# Patient Record
Sex: FEMALE | Race: WHITE | NOT HISPANIC OR LATINO | Employment: FULL TIME | URBAN - METROPOLITAN AREA
[De-identification: names, ages, dates, MRNs, and addresses within clinical notes are randomized per-mention and may not be internally consistent; named-entity substitution may affect disease eponyms.]

---

## 2019-06-06 ENCOUNTER — OFFICE VISIT CONVERTED (OUTPATIENT)
Dept: ORTHOPEDIC SURGERY | Facility: CLINIC | Age: 54
End: 2019-06-06
Attending: ORTHOPAEDIC SURGERY

## 2019-06-06 ENCOUNTER — CONVERSION ENCOUNTER (OUTPATIENT)
Dept: ORTHOPEDIC SURGERY | Facility: CLINIC | Age: 54
End: 2019-06-06

## 2019-07-09 ENCOUNTER — OFFICE VISIT CONVERTED (OUTPATIENT)
Dept: ORTHOPEDIC SURGERY | Facility: CLINIC | Age: 54
End: 2019-07-09
Attending: ORTHOPAEDIC SURGERY

## 2019-08-20 ENCOUNTER — OFFICE VISIT CONVERTED (OUTPATIENT)
Dept: ORTHOPEDIC SURGERY | Facility: CLINIC | Age: 54
End: 2019-08-20
Attending: ORTHOPAEDIC SURGERY

## 2019-12-27 ENCOUNTER — OFFICE VISIT CONVERTED (OUTPATIENT)
Dept: FAMILY MEDICINE CLINIC | Facility: CLINIC | Age: 54
End: 2019-12-27
Attending: NURSE PRACTITIONER

## 2020-02-04 ENCOUNTER — HOSPITAL ENCOUNTER (OUTPATIENT)
Dept: MAMMOGRAPHY | Facility: HOSPITAL | Age: 55
Discharge: HOME | End: 2020-02-04
Attending: NURSE PRACTITIONER

## 2020-10-21 ENCOUNTER — OFFICE VISIT CONVERTED (OUTPATIENT)
Dept: FAMILY MEDICINE CLINIC | Facility: CLINIC | Age: 55
End: 2020-10-21
Attending: NURSE PRACTITIONER

## 2020-10-21 ENCOUNTER — HOSPITAL ENCOUNTER (OUTPATIENT)
Dept: FAMILY MEDICINE CLINIC | Facility: CLINIC | Age: 55
Discharge: HOME | End: 2020-10-21
Attending: NURSE PRACTITIONER

## 2020-10-21 LAB
ALBUMIN SERPL-MCNC: 4.5 G/DL (ref 3.5–5)
ALBUMIN/GLOB SERPL: 1.6 {RATIO} (ref 1.4–2.6)
ALP SERPL-CCNC: 60 U/L (ref 53–141)
ALT SERPL-CCNC: 19 U/L (ref 10–40)
ANION GAP SERPL CALC-SCNC: 15 MMOL/L (ref 8–19)
AST SERPL-CCNC: 23 U/L (ref 15–50)
BASOPHILS # BLD AUTO: 0.03 10*3/UL (ref 0–0.2)
BASOPHILS NFR BLD AUTO: 0.5 % (ref 0–3)
BILIRUB SERPL-MCNC: 0.26 MG/DL (ref 0.2–1.3)
BUN SERPL-MCNC: 19 MG/DL (ref 5–25)
BUN/CREAT SERPL: 18 {RATIO} (ref 6–20)
CALCIUM SERPL-MCNC: 9.4 MG/DL (ref 8.7–10.4)
CHLORIDE SERPL-SCNC: 103 MMOL/L (ref 99–111)
CHOLEST SERPL-MCNC: 247 MG/DL (ref 107–200)
CHOLEST/HDLC SERPL: 3.4 {RATIO} (ref 3–6)
CONV ABS IMM GRAN: 0.02 10*3/UL (ref 0–0.2)
CONV CO2: 27 MMOL/L (ref 22–32)
CONV IMMATURE GRAN: 0.3 % (ref 0–1.8)
CONV TOTAL PROTEIN: 7.3 G/DL (ref 6.3–8.2)
CREAT UR-MCNC: 1.04 MG/DL (ref 0.5–0.9)
DEPRECATED RDW RBC AUTO: 45.4 FL (ref 36.4–46.3)
EOSINOPHIL # BLD AUTO: 0.27 10*3/UL (ref 0–0.7)
EOSINOPHIL # BLD AUTO: 4.4 % (ref 0–7)
ERYTHROCYTE [DISTWIDTH] IN BLOOD BY AUTOMATED COUNT: 13 % (ref 11.7–14.4)
GFR SERPLBLD BASED ON 1.73 SQ M-ARVRAT: >60 ML/MIN/{1.73_M2}
GLOBULIN UR ELPH-MCNC: 2.8 G/DL (ref 2–3.5)
GLUCOSE SERPL-MCNC: 93 MG/DL (ref 65–99)
HCT VFR BLD AUTO: 40.6 % (ref 37–47)
HDLC SERPL-MCNC: 73 MG/DL (ref 40–60)
HGB BLD-MCNC: 12.7 G/DL (ref 12–16)
LDLC SERPL CALC-MCNC: 154 MG/DL (ref 70–100)
LYMPHOCYTES # BLD AUTO: 2.04 10*3/UL (ref 1–5)
LYMPHOCYTES NFR BLD AUTO: 33.1 % (ref 20–45)
MCH RBC QN AUTO: 29.7 PG (ref 27–31)
MCHC RBC AUTO-ENTMCNC: 31.3 G/DL (ref 33–37)
MCV RBC AUTO: 95.1 FL (ref 81–99)
MONOCYTES # BLD AUTO: 0.54 10*3/UL (ref 0.2–1.2)
MONOCYTES NFR BLD AUTO: 8.8 % (ref 3–10)
NEUTROPHILS # BLD AUTO: 3.26 10*3/UL (ref 2–8)
NEUTROPHILS NFR BLD AUTO: 52.9 % (ref 30–85)
NRBC CBCN: 0 % (ref 0–0.7)
OSMOLALITY SERPL CALC.SUM OF ELEC: 294 MOSM/KG (ref 273–304)
PLATELET # BLD AUTO: 265 10*3/UL (ref 130–400)
PMV BLD AUTO: 10.8 FL (ref 9.4–12.3)
POTASSIUM SERPL-SCNC: 4.4 MMOL/L (ref 3.5–5.3)
RBC # BLD AUTO: 4.27 10*6/UL (ref 4.2–5.4)
SODIUM SERPL-SCNC: 141 MMOL/L (ref 135–147)
TRIGL SERPL-MCNC: 101 MG/DL (ref 40–150)
TSH SERPL-ACNC: 1.22 M[IU]/L (ref 0.27–4.2)
VLDLC SERPL-MCNC: 20 MG/DL (ref 5–37)
WBC # BLD AUTO: 6.16 10*3/UL (ref 4.8–10.8)

## 2020-10-22 LAB — 25(OH)D3 SERPL-MCNC: 43.7 NG/ML (ref 30–100)

## 2020-10-28 ENCOUNTER — OFFICE VISIT CONVERTED (OUTPATIENT)
Dept: FAMILY MEDICINE CLINIC | Facility: CLINIC | Age: 55
End: 2020-10-28
Attending: NURSE PRACTITIONER

## 2020-10-29 ENCOUNTER — HOSPITAL ENCOUNTER (OUTPATIENT)
Dept: FAMILY MEDICINE CLINIC | Facility: CLINIC | Age: 55
Discharge: HOME | End: 2020-10-29
Attending: NURSE PRACTITIONER

## 2020-11-03 LAB
CONV LAST MENSTURAL PERIOD: NORMAL
HPV HYBRID CAPTURE HIGH RISK: NEGATIVE
SPECIMEN SOURCE: NORMAL
SPECIMEN SOURCE: NORMAL
THIN PREP CVX: NORMAL

## 2021-05-13 NOTE — PROGRESS NOTES
Progress Note      Patient Name: Obdulia Lora   Patient ID: [id number]   Sex: Female   YOB: 1965    Primary Care Provider: Denilson REED   Referring Provider: Denilson REED    Visit Date: October 21, 2020    Provider: DEREK Aguirre   Location: [facility] [place]   Location Address: [address] [place] [address], [address] [place] [address]  [id number]   Location Phone: [phone]          Chief Complaint  · Annual physical exam  · Seasonal allergies      History Of Present Illness  Obdulia Lora is a 55 year old /White female who presents for evaluation and treatment of:      Patient is a 55-year-old female who comes in for annual physical exam.  She declines a flu shot.  She was a former smoker has quit for several years.  Last colorectal screening was 45 years ago.  She is in need of a Pap smear.  Blood pressure stable at 132/80.  Heart rate 85.  She denies any headache, chest pain, change in vision.  She otherwise feels well with minimal complaints.  She eats well, tries to exercise on a daily basis.    Patient's only complaint is seasonal allergies.  She feels like the Claritin is not working.  She has been prescribed Flonase in the past and would like to be placed back on the medication.  Patient would like a different medication besides Claritin to help with her allergies.       Allergy List  Allergen Name Date Reaction Notes   NO KNOWN DRUG ALLERGIES --  --  --        Allergies Reconciled  Family Medical History  Disease Name Relative/Age Notes   Heart Disease Father/   --    Melanoma Father/   --          Social History  Finding Status Start/Stop Quantity Notes   Alcohol Use Current some day --/-- --  occasionally drinks   lives with spouse --  --/-- --  --    . --  --/-- --  --    Recreational Drug Use Never --/-- --  no   Tobacco Former 1990/1995 --  smoked 1ppd   Working --  --/-- --  --          Review of Systems  · Constitutional  o Denies  o :  "fever, fatigue, weight loss, weight gain  · Eyes  o Denies  o : double vision, impaired vision, blurred vision  · HENT  o Admits  o : nasal congestion, nasal discharge  o Denies  o : headaches, vertigo, lightheadedness  · Cardiovascular  o Denies  o : lower extremity edema, claudication, chest pressure, palpitations  · Respiratory  o Denies  o : shortness of breath, wheezing, cough, hemoptysis, dyspnea on exertion  · Gastrointestinal  o Denies  o : nausea, vomiting, diarrhea, constipation, abdominal pain  · Integument  o Denies  o : rash, itching, pigmentation changes  · Musculoskeletal  o Denies  o : joint pain, joint swelling, muscle pain  · Psychiatric  o Denies  o : anxiety, depression, impulsive behaviors  · Allergic-Immunologic  o Admits  o : sinus allergy symptoms  o Denies  o : frequent illnesses      Vitals  Date Time BP Position Site L\R Cuff Size HR RR TEMP (F) WT  HT  BMI kg/m2 BSA m2 O2 Sat FR L/min FiO2        10/21/2020 03:22 /80 Sitting    85 - R  98.6 154lbs 4oz 5'  2\" 28.21 1.75 95 %            Physical Examination  · Constitutional  o Appearance  o : well-nourished, well developed, in no acute distress  · Eyes  o Conjunctivae  o : conjunctivae normal, no exudates present  o Sclerae  o : sclerae white  o Pupils and Irises  o : pupils equal and round, and reactive to light and accomodation bilaterally  o Eyelids/Ocular Adnexae  o : extra ocular movements intact  · Respiratory  o Respiratory Effort  o : breathing unlabored, no accessory muscle use  o Inspection of Chest  o : normal appearance, no retractions  o Auscultation of Lungs  o : normal breath sounds bilaterally  · Cardiovascular  o Heart  o :   § Auscultation of Heart  § : regular rate and rhythm, no murmurs, gallops or rubs  o Peripheral Vascular System  o :   § Extremities  § : no edema  · Neurologic  o Mental Status Examination  o :   § Orientation  § : alert and oriented x3  § Speech/Language  § : normal speech pattern  o Gait " and Station  o : normal gait, able to stand without difficulty  · Psychiatric  o Judgement and Insight  o : judgment and insight intact, judgement for everyday activities and social situations within normal limits, insight intact  o Thought Processes  o : rate of thoughts normal, thought content logical  o Mood and Affect  o : mood normal, affect appropriate              Assessment  · Annual physical exam     V70.0/Z00.00  · Allergic rhinitis due to allergen     477.9/J30.9  Will discontinue Claritin, start on Allegra and breakthrough allergy medication. We will also prescribe Flonase per patient's request. Discussed return precautions. Patient verbalized understanding is agreeable treatment plan.  · Vitamin D deficiency     268.9/E55.9      Plan  · Orders  o Physical, Primary Care Panel (CBC, CMP, Lipid, TSH) [facility] (28882, 67029, 74841, 60649) - V70.0/Z00.00 - 10/21/2020  o Vitamin D Level (59286) - 268.9/E55.9 - 10/21/2020  o ACO-39: Current medications updated and reviewed (1159F, ) - - 10/21/2020  o ACO-14: Influenza immunization was not administered for reasons documented [facility] () - - 10/21/2020  · Medications  o betamethasone dipropionate 0.05 % topical cream   SIG: apply a thin layer to the affected area(s) by topical route 2 times per day PRN for 30 days   DISP: (60) Gram with 3 refills  Prescribed on 10/21/2020     o chlorpheniramine maleate 4 mg oral tablet   SIG: take 1 tablet (4 mg) by oral route every 4-6 hours as needed for 30 days   DISP: (90) Tablet with 3 refills  Prescribed on 10/21/2020     o fluticasone propionate 50 mcg/actuation nasal spray,suspension   SIG: spray 1 - 2 sprays in each nostril by intranasal route once daily   DISP: (1) Bottle with 2 refills  Prescribed on 10/21/2020     o fexofenadine 180 mg oral tablet   SIG: take 1 tablet (180 mg) by oral route once daily for 30 days   DISP: (30) Tablet with 2 refills  Prescribed on 10/21/2020     o betamethasone dipropionate 0.05 %  topical cream   SIG: apply a thin layer to the affected area(s) by topical route 2 times per day PRN for 30 days   DISP: (60) Gram with 3 refills  Discontinued on 10/21/2020     o Claritin 10 mg oral tablet   SIG: take 1 tablet (10 mg) by oral route once daily for 90 days   DISP: (90) tablets with 3 refills  Discontinued on 10/21/2020     · Instructions  o Reviewed health maintenance flowsheet and updated information. Orders were placed and/or patient's response was documented.  o Take all medications as prescribed/directed.  o Patient was educated/instructed on their diagnosis, treatment and medications prior to discharge from the clinic today.  o Call the office with any concerns or questions.  · Disposition  o Call or Return if symptoms worsen or persist.  o Follow up 1 year  o follow up as needed  o call the office with any questions or concerns            Electronically Signed by: Denilson Ramírez APRN -Author on October 21, 2020 04:00:27 PM

## 2021-05-13 NOTE — PROGRESS NOTES
Progress Note      Patient Name: Obdulia Lora   Patient ID: [id number]   Sex: Female   YOB: 1965    Primary Care Provider: Denilson REED   Referring Provider: Denilson REED    Visit Date: October 28, 2020    Provider: DEREK Aguirre   Location: [facility] [place]   Location Address: [address] [place] [address], [address] [place] [address]  [id number]   Location Phone: [phone]          Chief Complaint  · Annual Exam  · PAP exam  · (Health Maintainence Information Reviewed Under Results)      History Of Present Illness  Obdulia Lora is a 55 year old /White female who presents for evaluation and treatment of:   Last PAP Smear: 10/28/2018.   Date of Last Mammogram: 11/14/2019.   Date of Last Colonoscopy: 08/05/2016   No current complaints.      Patient 65-year-old female here for annual Pap smear.  She is never had abnormal Pap in the past.  Last mammogram 2019 was normal.  Last colonoscopy was 4-1/2 years ago.  Last Pap was 3 years ago.  She declines a flu shot.  She is a former smoker.  She has no complaints today.       Medication List  Name Date Started Instructions   chlorpheniramine maleate 4 mg oral tablet 10/21/2020 take 1 tablet (4 mg) by oral route every 4-6 hours as needed for 30 days   fexofenadine 180 mg oral tablet 10/21/2020 take 1 tablet (180 mg) by oral route once daily for 30 days   fluticasone propionate 50 mcg/actuation nasal spray,suspension 10/21/2020 spray 1 - 2 sprays in each nostril by intranasal route once daily         Allergy List  Allergen Name Date Reaction Notes   NO KNOWN DRUG ALLERGIES --  --  --          Family Medical History  Disease Name Relative/Age Notes   Heart Disease Father/   --    Melanoma Father/   --          Social History  Finding Status Start/Stop Quantity Notes   Alcohol Use Current some day --/-- --  occasionally drinks   lives with spouse --  --/-- --  --    . --  --/-- --  --    Recreational Drug Use Never  "--/-- --  no   Tobacco Former 1990/1995 --  smoked 1ppd   Working --  --/-- --  --          Review of Systems  · Constitutional  o Denies  o : fatigue, night sweats  · Eyes  o Denies  o : double vision, blurred vision  · HENT  o Denies  o : vertigo, recent head injury  · Breasts  o Denies  o : lumps, tenderness, swelling  · Cardiovascular  o Denies  o : chest pain, irregular heart beats  · Respiratory  o Denies  o : shortness of breath, productive cough  · Gastrointestinal  o Denies  o : nausea, vomiting  · Genitourinary  o Denies  o : dysuria, urinary retention  · Integument  o Denies  o : hair growth change, new skin lesions  · Neurologic  o Denies  o : altered mental status, seizures  · Musculoskeletal  o Denies  o : joint swelling, limitation of motion      Vitals  Date Time BP Position Site L\R Cuff Size HR RR TEMP (F) WT  HT  BMI kg/m2 BSA m2 O2 Sat FR L/min FiO2        10/28/2020 09:32 /78 Sitting    72 - R  97.3 155lbs 8oz 5'  2\" 28.44 1.76 97 %            Physical Examination  · Constitutional  o Appearance  o : well-nourished, in no acute distress  · Eyes  o Conjunctivae  o : conjunctivae normal, no exudates present  o Sclerae  o : sclerae white  o Pupils and Irises  o : pupils equal and round, and reactive to light and accomodation bilaterally  o Eyelids/Ocular Adnexae  o : extra ocular movements intact  · Neck  o Inspection/Palpation  o : normal appearance, no masses or tenderness, trachea midline  o Range of Motion  o : range of motion within normal limits  o Thyroid  o : gland size normal, nontender, no nodules or masses present on palpation  · Respiratory  o Respiratory Effort  o : breathing unlabored  o Inspection of Chest  o : normal appearance  o Auscultation of Lungs  o : normal breath sounds throughout  · Cardiovascular  o Heart  o :   § Auscultation of Heart  § : regular rate and rhythm, no murmurs, gallops or rubs  o Peripheral Vascular System  o :   § Extremities  § : no " edema  · Breasts  o Inspection of Breasts  o : breasts symmetrical, no skin changes, no deformities present, no discharge present  o Palpation of Breasts, Axillae  o : no masses present on palpation, no breast tenderness  · Genitourinary  o External Genitalia  o : no inflammation, no lesions present  o Vagina  o : normal vaginal vault, no discharge present, no inflammatory lesions present, no masses present  o Bladder  o : nontender to palpation  o Cervix  o : appearance healthy, no lesions present, nontender to palpation, no discharges, no bleeding present, normal midline position  o Uterus  o : nontender to palpation, no masses present, position midline/midplane  o Adnexa  o : no tenderness or masses present on bimanual examination  o Anus  o : no inflammation or lesions present  o Perineum  o : perineum within normal limits  · Neurologic  o Mental Status Examination  o :   § Orientation  § : grossly oriented to person, place and time  § Speech/Language  § : normal speech pattern  o Gait and Station  o : normal gait, able to stand without difficulty              Assessment  · Routine gynecological examination     V72.31/Z01.419  · Pap Smear     V76.2/Z01.419      Plan  · Orders  o Pap smear (65103) - V76.2/Z01.419 - 10/28/2020  o ACO-39: Current medications updated and reviewed (1159F, ) - - 10/28/2020  o ACO-14: Influenza immunization was not administered for reasons documented [facility] () - - 10/28/2020  · Instructions  o **Pap Test/Liquid Based:   o Thin Prep  o Source:   o Cervix  o ********  o **Perform a routine Human Papilloma Virus (HPV) High Risk on this Pap   o ********  o Medicare:  o No  o **Is this an annual PAP:  o Yes  o Post Menopausal:  o Previous Pap Smear Date: 3 years ago   o Take all medications as prescribed/directed.  o Patient was educated/instructed on their diagnosis, treatment and medications prior to discharge from the clinic today.  o Call the office with any concerns or  questions.  o Counseled on monthly breast self exams.   o Counseled on STD prevention.  o Counseled on diet and exercise.   o Counseled on weight-bearing exercise.  o Recommended Calcium with Vitamin D twice daily.  · Disposition  o Call or Return if symptoms worsen or persist.  o follow up as needed  o call the office with any questions or concerns            Electronically Signed by: Denilson Ramírez APRN -Author on October 28, 2020 10:16:15 AM

## 2021-05-14 VITALS
HEIGHT: 62 IN | TEMPERATURE: 98.6 F | BODY MASS INDEX: 28.39 KG/M2 | WEIGHT: 154.25 LBS | OXYGEN SATURATION: 95 % | SYSTOLIC BLOOD PRESSURE: 132 MMHG | HEART RATE: 85 BPM | DIASTOLIC BLOOD PRESSURE: 80 MMHG

## 2021-05-14 VITALS
WEIGHT: 155.5 LBS | BODY MASS INDEX: 28.61 KG/M2 | OXYGEN SATURATION: 97 % | HEART RATE: 72 BPM | SYSTOLIC BLOOD PRESSURE: 124 MMHG | DIASTOLIC BLOOD PRESSURE: 78 MMHG | TEMPERATURE: 97.3 F | HEIGHT: 62 IN

## 2021-05-15 VITALS — HEART RATE: 91 BPM | HEIGHT: 62 IN | OXYGEN SATURATION: 96 %

## 2021-05-15 VITALS
HEART RATE: 74 BPM | HEIGHT: 62 IN | OXYGEN SATURATION: 100 % | DIASTOLIC BLOOD PRESSURE: 82 MMHG | SYSTOLIC BLOOD PRESSURE: 126 MMHG | WEIGHT: 158.5 LBS | TEMPERATURE: 98.2 F | BODY MASS INDEX: 29.17 KG/M2

## 2021-05-15 VITALS — BODY MASS INDEX: 26.75 KG/M2 | WEIGHT: 145.37 LBS | HEART RATE: 94 BPM | HEIGHT: 62 IN | OXYGEN SATURATION: 98 %

## 2021-05-15 VITALS — HEIGHT: 62 IN | WEIGHT: 150 LBS | HEART RATE: 91 BPM | OXYGEN SATURATION: 98 % | BODY MASS INDEX: 27.6 KG/M2

## 2022-10-26 ENCOUNTER — OFFICE VISIT (OUTPATIENT)
Dept: FAMILY MEDICINE CLINIC | Facility: CLINIC | Age: 57
End: 2022-10-26

## 2022-10-26 VITALS
WEIGHT: 177 LBS | OXYGEN SATURATION: 100 % | BODY MASS INDEX: 32.57 KG/M2 | DIASTOLIC BLOOD PRESSURE: 74 MMHG | SYSTOLIC BLOOD PRESSURE: 114 MMHG | HEIGHT: 62 IN | HEART RATE: 79 BPM | TEMPERATURE: 98.4 F

## 2022-10-26 DIAGNOSIS — J21.9 ACUTE BRONCHIOLITIS DUE TO UNSPECIFIED ORGANISM: Primary | ICD-10-CM

## 2022-10-26 PROCEDURE — 99213 OFFICE O/P EST LOW 20 MIN: CPT | Performed by: NURSE PRACTITIONER

## 2022-10-26 RX ORDER — AZITHROMYCIN 250 MG/1
TABLET, FILM COATED ORAL
Qty: 6 TABLET | Refills: 0 | Status: SHIPPED | OUTPATIENT
Start: 2022-10-26

## 2022-10-26 RX ORDER — PREDNISONE 20 MG/1
40 TABLET ORAL DAILY
Qty: 10 TABLET | Refills: 0 | Status: SHIPPED | OUTPATIENT
Start: 2022-10-26 | End: 2022-10-31

## 2022-10-26 RX ORDER — GUAIFENESIN AND DEXTROMETHORPHAN HYDROBROMIDE 1200; 60 MG/1; MG/1
1 TABLET, EXTENDED RELEASE ORAL 2 TIMES DAILY
Qty: 28 EACH | Refills: 0 | Status: SHIPPED | OUTPATIENT
Start: 2022-10-26

## 2022-10-26 NOTE — PROGRESS NOTES
"Chief Complaint  Cough (X few months)    Subjective         Obdulia Lora presents to [facility]  Cough  This is a new problem. Episode onset: 1 month. The problem has been gradually worsening. The problem occurs every few minutes. The cough is productive of sputum (yellow sputum). Associated symptoms include ear congestion, shortness of breath and wheezing. Pertinent negatives include no chills, ear pain, fever, hemoptysis, nasal congestion, postnasal drip, sore throat or sweats. The symptoms are aggravated by lying down. She has tried OTC cough suppressant for the symptoms. The treatment provided no relief.          Social History     Socioeconomic History   • Marital status:    Tobacco Use   • Smoking status: Former     Packs/day: 0.10     Years: 3.00     Pack years: 0.30     Types: Cigarettes     Quit date:      Years since quittin.8   • Smokeless tobacco: Never   Vaping Use   • Vaping Use: Never used        Objective     Vitals:    10/26/22 1053   BP: 114/74   BP Location: Left arm   Patient Position: Sitting   Pulse: 79   Temp: 98.4 °F (36.9 °C)   TempSrc: Oral   SpO2: 100%   Weight: 80.3 kg (177 lb)   Height: 157.5 cm (62\")        Body mass index is 32.37 kg/m².    Wt Readings from Last 3 Encounters:   10/26/22 80.3 kg (177 lb)   10/28/20 70.5 kg (155 lb 8 oz)   10/21/20 70 kg (154 lb 4 oz)       BP Readings from Last 3 Encounters:   10/26/22 114/74   10/28/20 124/78   10/21/20 132/80             Physical Exam  Vitals reviewed.   Constitutional:       Appearance: Normal appearance. She is well-developed.   HENT:      Head: Normocephalic and atraumatic.      Right Ear: External ear normal.      Left Ear: External ear normal.      Mouth/Throat:      Pharynx: No oropharyngeal exudate.   Eyes:      Conjunctiva/sclera: Conjunctivae normal.      Pupils: Pupils are equal, round, and reactive to light.   Cardiovascular:      Rate and Rhythm: Normal rate and regular " rhythm.      Heart sounds: No murmur heard.    No friction rub. No gallop.   Pulmonary:      Effort: Pulmonary effort is normal.      Breath sounds: Examination of the right-upper field reveals wheezing and rhonchi. Examination of the right-lower field reveals wheezing and rhonchi. Wheezing and rhonchi present.   Skin:     General: Skin is warm and dry.   Neurological:      Mental Status: She is alert and oriented to person, place, and time.          Result Review :   The following data was reviewed by: DEREK Frederick on 10/26/2022:      Procedures    Assessment and Plan   Diagnoses and all orders for this visit:    1. Acute bronchiolitis due to unspecified organism (Primary)  -     azithromycin (Zithromax Z-Murtaza) 250 MG tablet; Take 2 tablets by mouth on day 1, then 1 tablet daily on days 2-5  Dispense: 6 tablet; Refill: 0  -     predniSONE (DELTASONE) 20 MG tablet; Take 2 tablets by mouth Daily for 5 days.  Dispense: 10 tablet; Refill: 0  -     Dextromethorphan-guaiFENesin (Mucinex DM Maximum Strength)  MG tablet sustained-release 12 hour; Take 1 tablet by mouth 2 (Two) Times a Day.  Dispense: 28 each; Refill: 0    If symptoms worsen seek medical attention.  Will prescribe Z-Murtaza, prednisone 40 mg daily x5 days and Mucinex DM.  If cough does not improve will order chest x-ray.         Follow Up   Return if symptoms worsen or fail to improve.  Patient was given instructions and counseling regarding her condition or for health maintenance advice. Please see specific information pulled into the AVS if appropriate.     Please note that portions of this note were completed with a voice recognition program.